# Patient Record
Sex: MALE | Race: WHITE | NOT HISPANIC OR LATINO | URBAN - METROPOLITAN AREA
[De-identification: names, ages, dates, MRNs, and addresses within clinical notes are randomized per-mention and may not be internally consistent; named-entity substitution may affect disease eponyms.]

---

## 2024-06-20 PROCEDURE — 87081 CULTURE SCREEN ONLY: CPT

## 2024-06-21 ENCOUNTER — LAB REQUISITION (OUTPATIENT)
Dept: LAB | Facility: HOSPITAL | Age: 19
End: 2024-06-21
Payer: COMMERCIAL

## 2024-06-21 DIAGNOSIS — J02.9 ACUTE PHARYNGITIS, UNSPECIFIED: ICD-10-CM

## 2024-06-23 LAB — S PYO THROAT QL CULT: NORMAL

## 2025-02-13 ENCOUNTER — TELEPHONE (OUTPATIENT)
Dept: ORTHOPEDIC SURGERY | Facility: HOSPITAL | Age: 20
End: 2025-02-13
Payer: COMMERCIAL

## 2025-02-13 NOTE — TELEPHONE ENCOUNTER
Received message from Theresa that patient had called their office asking for an order for PT.  Patient last saw us 5/2023 for his hand.  Patient was not available when I called but I provided the office phone number for him to call back.  P:[phone] opt 2

## 2025-03-24 ENCOUNTER — OFFICE VISIT (OUTPATIENT)
Dept: ORTHOPEDIC SURGERY | Facility: HOSPITAL | Age: 20
End: 2025-03-24
Payer: COMMERCIAL

## 2025-03-24 ENCOUNTER — HOSPITAL ENCOUNTER (OUTPATIENT)
Dept: RADIOLOGY | Facility: HOSPITAL | Age: 20
Discharge: HOME | End: 2025-03-24
Payer: COMMERCIAL

## 2025-03-24 VITALS — BODY MASS INDEX: 32.58 KG/M2 | HEIGHT: 69 IN | WEIGHT: 220 LBS

## 2025-03-24 DIAGNOSIS — M79.641 RIGHT HAND PAIN: ICD-10-CM

## 2025-03-24 DIAGNOSIS — S62.629S: Primary | ICD-10-CM

## 2025-03-24 PROCEDURE — 73130 X-RAY EXAM OF HAND: CPT | Mod: RT

## 2025-03-24 PROCEDURE — 73130 X-RAY EXAM OF HAND: CPT | Mod: RIGHT SIDE | Performed by: RADIOLOGY

## 2025-03-24 PROCEDURE — 3008F BODY MASS INDEX DOCD: CPT | Performed by: EMERGENCY MEDICINE

## 2025-03-24 PROCEDURE — 99213 OFFICE O/P EST LOW 20 MIN: CPT | Performed by: EMERGENCY MEDICINE

## 2025-03-24 ASSESSMENT — PAIN - FUNCTIONAL ASSESSMENT: PAIN_FUNCTIONAL_ASSESSMENT: NO/DENIES PAIN

## 2025-03-24 NOTE — PROGRESS NOTES
"Subjective   Reji Tatum is a 19 y.o. male who presents for Pain of the Right Hand    HPI    3/24/25: Patient returns today for reevaluation for right hand middle finger pain.  Date of injury 2/19/2025.  He has been compliant with a finger splint.  Overall, he feels that he has been improving since his last visit.  He does continue have stiffness along the PIP joint.  However, the swelling has improved significantly.    2/20/25: 19-year-old right-hand-dominant male presents injury clinic today with complaint of right hand middle finger pain that occurred after an injury yesterday.  Date of injury 2/19/2025.  He was play basketball when his middle finger was jammed by another player's heel.  He noticed significant pain and swelling over the proximal phalanx.  No previous injuries or surgeries to the right hand.    ROS: All pertinent positive symptoms are included in the history of present illness.    All other systems have been reviewed and are negative and noncontributory to this patient's current ailments.    Objective     Vitals:    03/24/25 1315   Weight: 99.8 kg (220 lb)   Height: 1.753 m (5' 9\")       Physical Exam  General/Constitutional: No apparent distress. Well-nourished and well developed.  Head: Normocephalic, Atraumatic.   Eyes: EOMI.  Vascular: No edema, swelling or tenderness, except as noted in detailed exam.  Respiratory: Non-labored breathing.  Integumentary: No impressive skin lesions present, except as noted in detailed exam.  Neurological: Alert and oriented.  Psychological:  Normal mood and affect.  Musculoskeletal: Normal, except as noted in detailed exam.  Right hand: No rash.  No deformity.  No erythema.  Swelling present along the PIP joint of the middle finger has resolved.  Mild tenderness palpation over the PIP joint of the middle finger.  Limited flexion of the PIP joint secondary to pain..  Full extension of the PIP joint.  Full range of motion of the MCP and DIP joints of the " middle finger.    Imaging:   Radiographs:   Right hand PA, oblique, lateral: Revisualization of questionable volar plate injury of the proximal middle phalanx with questionable callus formation and no displacement.    Assessment/Plan   Problem List Items Addressed This Visit    None  Visit Diagnoses       Closed avulsion fracture of middle phalanx of finger, sequela    -  Primary    Relevant Orders    Referral to Occupational Therapy    Right hand pain        Relevant Orders    XR hand right 3+ views          I discussed imaging and examination findings with patient.  I do feel that radiographs are reassuring.  Examination is reassuring as well.  This point, I wean him out of the splint.  I did discuss active range of motion exercises with the patient.  I have given him a referral to hand therapy.  I would like to see him back as needed if pain persist.  Otherwise, he can progress to all activities without restriction.    Alberto Guillen, DO  Sports Medicine  [facility], [facility] [place]    ** Please excuse any errors in grammar or translation related to this dictation. Voice recognition software was utilized to prepare this document. **

## 2025-07-11 ENCOUNTER — APPOINTMENT (OUTPATIENT)
Dept: RADIOLOGY | Facility: HOSPITAL | Age: 20
End: 2025-07-11
Payer: COMMERCIAL

## 2025-07-11 ENCOUNTER — HOSPITAL ENCOUNTER (EMERGENCY)
Facility: HOSPITAL | Age: 20
Discharge: HOME | End: 2025-07-11
Payer: COMMERCIAL

## 2025-07-11 VITALS
OXYGEN SATURATION: 99 % | TEMPERATURE: 98.4 F | RESPIRATION RATE: 16 BRPM | BODY MASS INDEX: 32.58 KG/M2 | DIASTOLIC BLOOD PRESSURE: 85 MMHG | SYSTOLIC BLOOD PRESSURE: 145 MMHG | HEART RATE: 90 BPM | WEIGHT: 220 LBS | HEIGHT: 69 IN

## 2025-07-11 DIAGNOSIS — S63.601A SPRAIN OF RIGHT THUMB, UNSPECIFIED SITE OF DIGIT, INITIAL ENCOUNTER: Primary | ICD-10-CM

## 2025-07-11 PROCEDURE — 73130 X-RAY EXAM OF HAND: CPT | Mod: RT

## 2025-07-11 PROCEDURE — 99283 EMERGENCY DEPT VISIT LOW MDM: CPT

## 2025-07-11 PROCEDURE — 73130 X-RAY EXAM OF HAND: CPT | Mod: RIGHT SIDE | Performed by: RADIOLOGY

## 2025-07-11 NOTE — ED PROVIDER NOTES
HPI   Chief Complaint   Patient presents with    Hand Pain     thumb       This is a 19-year-old male presenting for evaluation of right thumb pain after it was jammed while playing basketball prior to arrival.  Reports pain in the thenar eminence and whenever he tries to move his thumb.  Denies any other complaints or injuries at this time.      History provided by:  Patient   used: No            Patient History   Medical History[1]  Surgical History[2]  Family History[3]  Social History[4]    Physical Exam   ED Triage Vitals   Temperature Heart Rate Respirations BP   07/11/25 1707 07/11/25 1707 07/11/25 1707 07/11/25 1710   36.9 °C (98.4 °F) 90 16 145/85      Pulse Ox Temp src Heart Rate Source Patient Position   07/11/25 1707 -- -- --   98 %         BP Location FiO2 (%)     -- --             Physical Exam    General: Vitals noted, no distress. Afebrile  Cardiac: Regular rate and rhythm. No murmur.  Pulmonary: Lungs clear bilaterally with good aeration. No adventitious breath sounds.   Extremities: Exam of the right hand shows TTP thenar eminence.  Tenderness with active and passive rotation of the thumb. The skin is intact. Is neurovascularly intact distally. Specifically, has full strength with flexion and extension of the digits. Is nontender over the wrist. Remainder the extremity is nontender.  Snuffbox nontender.    ED Course & MDM   Diagnoses as of 07/11/25 1802   Sprain of right thumb, unspecified site of digit, initial encounter                 No data recorded                                 Medical Decision Making  DDx: fracture, dislocation, nonvisualized/occult fracture, tendon/ligament injury, soft tissue injury    Physical exam as above.  Neurovascularly intact.  X-ray negative for fracture.  Likely sprain.  Velcro spica wrist splint was applied by nursing.  Normal neurovascular exam prior to and after splint placement.  Follow-up with orthopedics.  KAYLEY.  OT  analgesia/anti-inflammatories as needed.  Instructed to return to the nearest ED if any concerns or new or worsening symptoms. Patient verbalized understanding and agreement with plan. Discharged in stable condition.      Disclaimer: This note was dictated using speech recognition software. An attempt at proofreading was made to minimize errors. Minor errors in transcription may be present.    Amount and/or Complexity of Data Reviewed  Radiology: ordered.        Procedure  Splint Application    Performed by: Darek El PA-C  Authorized by: Darek El PA-C    Consent:     Consent obtained:  Verbal    Consent given by:  Patient  Pre-procedure details:     Distal neurologic exam:  Normal    Distal perfusion: distal pulses strong and brisk capillary refill    Procedure details:     Location:  Hand    Hand location:  R hand    Strapping: no      Upper extremity cast type: velcro wrist hand thumb spica.    Splint type:  Thumb spica    Supplies:  Prefabricated splint    Splint applied by::  Nurse    Supervision: I personally supervised and inspected the splint/strap which was applied. The extremity is appropriately immobilized. Patient neurovascularly intact before and after the splint application.    Post-procedure details:     Distal neurologic exam:  Normal    Distal perfusion: distal pulses strong and brisk capillary refill      Procedure completion:  Tolerated    Post-procedure imaging: not applicable           [1]   Past Medical History:  Diagnosis Date    Personal history of other diseases of the nervous system and sense organs 02/24/2022    History of otitis media   [2]   Past Surgical History:  Procedure Laterality Date    ADENOIDECTOMY  02/01/2018    Adenoidectomy    TYMPANOSTOMY TUBE PLACEMENT  02/01/2018    Ear Pressure Equalization Tube, Insertion, Bilaterally   [3] No family history on file.  [4]   Social History  Tobacco Use    Smoking status: Never    Smokeless tobacco: Never   Substance Use  Topics    Alcohol use: Not on file    Drug use: Not on file        Darek El PA-C  07/11/25 3729

## 2025-07-15 ENCOUNTER — APPOINTMENT (OUTPATIENT)
Dept: ORTHOPEDIC SURGERY | Facility: HOSPITAL | Age: 20
End: 2025-07-15
Payer: COMMERCIAL

## 2025-07-15 ENCOUNTER — OFFICE VISIT (OUTPATIENT)
Dept: ORTHOPEDIC SURGERY | Facility: HOSPITAL | Age: 20
End: 2025-07-15
Payer: COMMERCIAL

## 2025-07-15 VITALS — WEIGHT: 220 LBS | BODY MASS INDEX: 32.58 KG/M2 | HEIGHT: 69 IN

## 2025-07-15 DIAGNOSIS — S63.601A SPRAIN OF RIGHT THUMB, UNSPECIFIED SITE OF DIGIT, INITIAL ENCOUNTER: ICD-10-CM

## 2025-07-15 PROCEDURE — 99213 OFFICE O/P EST LOW 20 MIN: CPT | Performed by: PHYSICIAN ASSISTANT

## 2025-07-15 PROCEDURE — 3008F BODY MASS INDEX DOCD: CPT | Performed by: PHYSICIAN ASSISTANT

## 2025-07-15 PROCEDURE — 99212 OFFICE O/P EST SF 10 MIN: CPT | Performed by: PHYSICIAN ASSISTANT

## 2025-07-15 ASSESSMENT — PAIN SCALES - GENERAL: PAINLEVEL_OUTOF10: 5 - MODERATE PAIN

## 2025-07-15 ASSESSMENT — PAIN - FUNCTIONAL ASSESSMENT: PAIN_FUNCTIONAL_ASSESSMENT: 0-10

## 2025-07-23 NOTE — PROGRESS NOTES
"HPI:  Reji Tatum is a 19 y.o. male who presents to the ortho injury clinic today for evaluation of R thumb sprain.     Injury occurred 07/11/25 - reports his thumb was \"jammed\" (flexed backward) while playing basketball. He noted pain, decreased ROM, bruising, swelling prompting ED evaluation DOI. He was provided thumb spica brace was provided and he was referred here for follow up.     He has been wearing the brace as instructed. He continues to have significant bruising/swelling along the CMC joint and thenar eminence with pain with thumb ROM. No numbness/tingling.     Has been icing and using plain tylenol for pain.    ROS:  Reviewed on EMR and patient intake sheet.    PMH/SH:  Reviewed on EMR and patient intake sheet.    Exam:  Physical Exam  Hand/Wrist Musculoskeletal Exam    Inspection    Right      Erythema: none      Ecchymosis: mild      Ecchymosis comment: cmc, thenar      Edema: mild (cmc, thenar)    Palpation    Right      Thumb tenderness to palpation: carpometacarpal joint      Dorsal hand - 1st metacarpal tenderness to palpation: CMC      Right wrist palpation is normal.    Palpation additional comments: Snuffbox NTTP    Range of Motion    Right Hand      Thumb interphalangeal: full       Thumb metacarpal phalangeal joint: limited       Thumb carpometacarpal joint: limited       Right Wrist      Right wrist range of motion is normal.       Neurovascular    Right       Right neurovascular exam is normal.    General    Psychiatric: normal mood and affect and no acute distress    Neurological: alert and oriented x3        Radiology:     Xrays R hand dated 07/11/25personally reviewed, along with the accompanying report.  Soft tissue edema without fracture or dislocation.       Assessment and Plan:  1. Sprain of right thumb, unspecified site of digit, initial encounter  - Referral to Orthopedic Injury Clinic - Same Day Access  - Referral to Occupational Therapy; Future    He was instructed to " continue with the brace for the time being. As his pain/swelling improves okay to start gentle ROM exercises.     Referral to occupational therapy provided today. Follow up appointment made with Laure Monreal PA-C on 08/07/25.    Patient in agreement to plan of care. Of course Reji Tatum is welcome to call at anytime with questions or concerns.     Eleonora Evangelista PA-C  Department of Orthopaedic Surgery    [address] [place] [address]    Voicemail: [phone]   Appts: [phone]  Fax: [phone]

## 2025-07-31 NOTE — PROGRESS NOTES
Occupational Therapy  Occupational Therapy Orthopedic Evaluation    Patient Name: Reji Tatum  MRN: [id number]  Today's Date: 8/6/2025  Time Calculation  Start Time: 1605  Stop Time: 1645  Time Calculation (min): 40 min    Insurance:  Visit number: 1 of TBD  Authorization info: TBD  Insurance Type: [employer]    General:  Reason for visit: s/p R thumb injury  Referred by: Eleonora Evangelista PA-C    Current Problem  1. Strain of intrinsic muscle of right thumb  Follow Up In Occupational Therapy          Precautions: progress within tolerance    Precautions  STEADI Fall Risk Score (The score of 4 or more indicates an increased risk of falling): 0    Medical History Form: Reviewed (scanned into chart)    Subjective:   Chief Complaint: R thumb  NITESH:  jammed playing basketball (possible hyperflexed)  DOI: 7/11/25      Hand Dominance: Right    Current Condition since injury:   better      PAIN  Pain Assessment: 0-10  0-10 (Numeric) Pain Score: 0 - No pain  Location: R thumb    Relevant Information (PMH & Previous Tests/Imaging): xray negative for fx  Previous Interventions/Treatments:  thumb spica orthosis, icing    Prior Level of Function (PLOF)  Exercise/Physical Activity: rec basketball  Work/School: [employer]  Current ADL/IADL Status: using L hand for most tasks     Patients Living Environment: Reviewed and no concern    Primary Language: English    There are no spiritual/cultural practices/values/needs that are important to know      Pt goals for therapy: return full function of hand    Red Flags: Do you have any of the following? No  Fever/chills, unexplained weight changes, dizziness/fainting, unexplained change in bowel or bladder functions, unexplained malaise or muscle weakness, night pain/sweats, numbness or tingling    Objective:    RIGHT HAND AROM (Degrees)   MCP PIP DIP ROLDAN DPC   Thumb 38  50 88    Thumb RABD 35       Thumb PABD 35         LEFT HAND AROM (Degrees)   MCP PIP DIP ROLDAN DPC    Thumb 45  68 113    Thumb RABD 40       Thumb PABD 40         Able to make composite fist bilaterally digits 2-5      Hand Strength Measures (lbs)   R L   Dynamometer  defer defer   Lateral Pinch defer defer   3jaw Pinch defer defer      Finger/Thumb  Thumb UCL: -    Numbness/Tingling: none reported      Outcome Measures:  DASH: 56.82    EDUCATION: home exercise program, plan of care, activity modifications, pain management, and injury pathology       Goals:  Active       OT Goals       OT Goal 1       Start:  08/06/25    Expected End:  10/01/25       Pt will be able to return to playing basketball without increase in pain or difficulty using R hand in 8 weeks         OT Goal 2       Start:  08/06/25    Expected End:  08/20/25       Pt will increase ROLDAN of R thumb MP/IPJ flexion >100 in 2 weeks         OT Goal 3       Start:  08/06/25    Expected End:  09/03/25       Pt will report an overall increase in function, evidenced by a decrease in DASH score of 25 pts in 4 weeks             Plan of care was developed with input and agreement by the patient    Treatments:     Therapeutic Exercise:   20 min  HEP review - thumb AROM flex/ext, MP/IP blocking, RABD/PABD, opposition, circumduction, passive thumb flexion stretch.  Reviewed intrinsic stretch for MF followed by highlighter rolls      Assessment: Patient is a 20 yo male  s/p jamming injury to R thumb.  No increase in laxity of UCL/RCL of thumb compared to contralateral hand.  He has been in a thumb spica orthosis since injury, can start to wean off throughout the day as tolerated.  Initiated AROM all planes today with passive thumb flexion to tolerance.  He also reports a previous MF volar plate fx resulting in slight stiffness.  Able to achieve full composite fist however some slight tightness in intrinsic muscles noted.  Provided stretches and high lighter rolls to assist.  His condition is resulting in limited participation in pain-free ADLs and inability  to perform at their prior level of function. Ortho hand follow-up scheduled tomorrow.  Pt would benefit from occupational therapy to address the impairments found & listed previously in the objective section in order to return to safe and pain-free ADLs and prior level of function.       Clinical Presentation: Stable and/or uncomplicated characteristics       Plan:      Planned Interventions include: therapeutic exercise, therapeutic activity, self-care home management, manual therapy, therapeutic activities, gait training, neuromuscular coordination, vasopneumatic, dry needling, aquatic therapy, electric stimulation, fluidotherapy, ultrasound, kinesiotaping, orthosis fabrication, wound care  Frequency: 1-2 x Week  Duration: 4 Weeks  Rehab potential/prognosis: Excellent    Return for recheck of ROM in 2 weeks     Victor Manuel Nicole, OT

## 2025-08-06 ENCOUNTER — EVALUATION (OUTPATIENT)
Dept: OCCUPATIONAL THERAPY | Facility: HOSPITAL | Age: 20
End: 2025-08-06
Payer: COMMERCIAL

## 2025-08-06 DIAGNOSIS — S66.411A STRAIN OF INTRINSIC MUSCLE OF RIGHT THUMB: Primary | ICD-10-CM

## 2025-08-06 PROCEDURE — 97165 OT EVAL LOW COMPLEX 30 MIN: CPT | Mod: GO | Performed by: OCCUPATIONAL THERAPIST

## 2025-08-06 PROCEDURE — 97110 THERAPEUTIC EXERCISES: CPT | Mod: GO | Performed by: OCCUPATIONAL THERAPIST

## 2025-08-06 ASSESSMENT — PAIN - FUNCTIONAL ASSESSMENT: PAIN_FUNCTIONAL_ASSESSMENT: 0-10

## 2025-08-06 ASSESSMENT — PAIN SCALES - GENERAL: PAINLEVEL_OUTOF10: 0 - NO PAIN

## 2025-08-07 ENCOUNTER — APPOINTMENT (OUTPATIENT)
Dept: ORTHOPEDIC SURGERY | Facility: CLINIC | Age: 20
End: 2025-08-07
Payer: COMMERCIAL

## 2025-08-07 ENCOUNTER — APPOINTMENT (OUTPATIENT)
Dept: RADIOLOGY | Facility: CLINIC | Age: 20
End: 2025-08-07
Payer: COMMERCIAL

## 2025-08-07 DIAGNOSIS — M79.644 THUMB PAIN, RIGHT: ICD-10-CM

## 2025-08-07 DIAGNOSIS — Z87.81 HISTORY OF THUMB FRACTURE: ICD-10-CM

## 2025-08-20 ENCOUNTER — APPOINTMENT (OUTPATIENT)
Dept: OCCUPATIONAL THERAPY | Facility: HOSPITAL | Age: 20
End: 2025-08-20
Payer: COMMERCIAL